# Patient Record
Sex: FEMALE | Race: WHITE | NOT HISPANIC OR LATINO | Employment: OTHER | URBAN - METROPOLITAN AREA
[De-identification: names, ages, dates, MRNs, and addresses within clinical notes are randomized per-mention and may not be internally consistent; named-entity substitution may affect disease eponyms.]

---

## 2023-03-07 PROBLEM — R73.01 IMPAIRED FASTING GLUCOSE: Status: ACTIVE | Noted: 2020-12-17

## 2023-03-07 PROBLEM — J30.9 ALLERGIC RHINITIS: Status: ACTIVE | Noted: 2023-03-07

## 2023-03-07 PROBLEM — F41.9 ANXIETY: Status: ACTIVE | Noted: 2023-03-07

## 2023-03-07 RX ORDER — TRIAMCINOLONE ACETONIDE 55 UG/1
2 SPRAY, METERED NASAL DAILY
COMMUNITY
Start: 2021-10-14

## 2023-03-07 RX ORDER — FLUTICASONE PROPIONATE 50 MCG
2 SPRAY, SUSPENSION (ML) NASAL EVERY MORNING
COMMUNITY
Start: 2020-06-02

## 2023-03-07 RX ORDER — VIT C/E/ZN/COPPR/LUTEIN/ZEAXAN 250MG-90MG
1000 CAPSULE ORAL DAILY
COMMUNITY

## 2023-03-07 RX ORDER — LEVOTHYROXINE SODIUM 100 UG/1
112 TABLET ORAL DAILY
COMMUNITY
End: 2024-02-06 | Stop reason: WASHOUT

## 2023-03-08 ENCOUNTER — OFFICE VISIT (OUTPATIENT)
Dept: PRIMARY CARE | Facility: CLINIC | Age: 63
End: 2023-03-08
Payer: MEDICAID

## 2023-03-08 VITALS
SYSTOLIC BLOOD PRESSURE: 126 MMHG | HEIGHT: 64 IN | BODY MASS INDEX: 27.31 KG/M2 | DIASTOLIC BLOOD PRESSURE: 84 MMHG | TEMPERATURE: 97.6 F | WEIGHT: 160 LBS | HEART RATE: 76 BPM

## 2023-03-08 DIAGNOSIS — J32.1 CHRONIC FRONTAL SINUSITIS: Primary | ICD-10-CM

## 2023-03-08 PROCEDURE — 99213 OFFICE O/P EST LOW 20 MIN: CPT | Performed by: FAMILY MEDICINE

## 2023-03-08 RX ORDER — DOXYCYCLINE 100 MG/1
100 CAPSULE ORAL 2 TIMES DAILY
Qty: 28 CAPSULE | Refills: 0 | Status: SHIPPED | OUTPATIENT
Start: 2023-03-08 | End: 2023-03-22

## 2023-03-08 ASSESSMENT — PATIENT HEALTH QUESTIONNAIRE - PHQ9
2. FEELING DOWN, DEPRESSED OR HOPELESS: NOT AT ALL
SUM OF ALL RESPONSES TO PHQ9 QUESTIONS 1 AND 2: 0
1. LITTLE INTEREST OR PLEASURE IN DOING THINGS: NOT AT ALL

## 2023-03-08 ASSESSMENT — ENCOUNTER SYMPTOMS
SINUS PAIN: 1
FACIAL SWELLING: 1

## 2023-03-08 NOTE — PROGRESS NOTES
Subjective   Patient ID: Lei Day is a 62 y.o. female who presents for Facial Pain (X 2 months./JW) and Earache (L ear x 1 day./JW).  Sinusitis sx: x2 months.  Congestion, drainage and ear pain x 2 days on L side.    Facial Pain  Associated symptoms include congestion.   Earache         Current Outpatient Medications on File Prior to Visit   Medication Sig Dispense Refill    cholecalciferol (Vitamin D-3) 25 MCG (1000 UT) capsule       levothyroxine (Synthroid, Levoxyl) 100 mcg tablet Take 1 tablet (100 mcg) by mouth once daily.      fluticasone (Flonase) 50 mcg/actuation nasal spray Administer 2 sprays into each nostril once daily in the morning.      triamcinolone (Nasacort) 55 mcg nasal inhaler Administer 2 sprays into each nostril once daily.       No current facility-administered medications on file prior to visit.        Review of Systems   HENT:  Positive for congestion, ear pain, facial swelling and sinus pain.        Objective   Physical Exam    No visits with results within 2 Month(s) from this visit.   Latest known visit with results is:   [facility] Encounter on 05/03/2022   Component Date Value Ref Range Status    Hemoglobin A1C 05/03/2022 6.0 (A)  % Final    Comment:      Diagnosis of Diabetes-Adults   Non-Diabetic: < or = 5.6%   Increased risk for developing diabetes: 5.7-6.4%   Diagnostic of diabetes: > or = 6.5%  .       Monitoring of Diabetes                Age (y)     Therapeutic Goal (%)   Adults:          >18           <7.0   Pediatrics:    13-18           <7.5                   7-12           <8.0                   0- 6            7.5-8.5   American Diabetes Association. Diabetes Care 33(S1), Jan 2010.      Estimated Average Glucose 05/03/2022 126  MG/DL Final    TSH 05/03/2022 3.17  0.44 - 3.98 mIU/L Final    Comment:  TSH testing is performed using different testing    methodology at [facility] than at other    [facility]. Direct result comparisons should    only be made  within the same method.         Assessment/Plan   Problem List Items Addressed This Visit    None  Visit Diagnoses       Chronic frontal sinusitis    -  Primary    Relevant Medications    doxycycline (Vibramycin) 100 mg capsule        Treating patient with 2-week course of doxycycline

## 2023-08-04 ENCOUNTER — OFFICE VISIT (OUTPATIENT)
Dept: PRIMARY CARE | Facility: CLINIC | Age: 63
End: 2023-08-04
Payer: MEDICAID

## 2023-08-04 VITALS
HEART RATE: 92 BPM | TEMPERATURE: 97.3 F | OXYGEN SATURATION: 97 % | WEIGHT: 150 LBS | BODY MASS INDEX: 25.75 KG/M2 | SYSTOLIC BLOOD PRESSURE: 120 MMHG | DIASTOLIC BLOOD PRESSURE: 72 MMHG

## 2023-08-04 DIAGNOSIS — M25.561 RIGHT KNEE PAIN, UNSPECIFIED CHRONICITY: Primary | ICD-10-CM

## 2023-08-04 DIAGNOSIS — M11.261 CHONDROCALCINOSIS OF RIGHT KNEE: ICD-10-CM

## 2023-08-04 PROCEDURE — 99213 OFFICE O/P EST LOW 20 MIN: CPT | Performed by: PHYSICIAN ASSISTANT

## 2023-08-04 RX ORDER — DICLOFENAC SODIUM 75 MG/1
75 TABLET, DELAYED RELEASE ORAL 2 TIMES DAILY PRN
Qty: 20 TABLET | Refills: 0 | Status: SHIPPED | OUTPATIENT
Start: 2023-08-04 | End: 2024-01-01 | Stop reason: ALTCHOICE

## 2023-08-04 ASSESSMENT — ENCOUNTER SYMPTOMS
ABDOMINAL PAIN: 0
SHORTNESS OF BREATH: 0

## 2023-08-04 NOTE — PROGRESS NOTES
" Subjective   Patient ID: Lei Day is a 62 y.o. female who presents for Knee Pain (R knee pain x 3 days. NKI).    HPI   Getting flare up of right knee pain the past 3 days.  Has had recurrent flare ups of this knee like this in the past. Been doing a lot of house/yard work recently, including kneeling on it recently.  Getting pain and is a little swollen. Especially hurts on inner side of knee and behind knee.  It gets pretty tender. Last bad flare up was 9/2022, but sometimes get some ache in it.  Using some Voltaren gel.     Right knee xray 9/26/22 showed \"Mild patellofemoral osteoarthrosis. Chondrocalcinosis of the medial and lateral compartments which are otherwise preserved, although can be seen with early changes of CPPD arthropathy.\"    Review of Systems   Respiratory:  Negative for shortness of breath.    Cardiovascular:  Negative for chest pain.   Gastrointestinal:  Negative for abdominal pain.       Objective   /72   Pulse 92   Temp 36.3 °C (97.3 °F)   Wt 68 kg (150 lb)   SpO2 97%   BMI 25.75 kg/m²     Physical Exam  Vitals and nursing note reviewed.   Constitutional:       Appearance: Normal appearance. She is well-developed.   Eyes:      General: No scleral icterus.  Cardiovascular:      Rate and Rhythm: Normal rate and regular rhythm.      Heart sounds: No murmur heard.  Pulmonary:      Effort: Pulmonary effort is normal.      Breath sounds: Normal breath sounds.   Musculoskeletal:      Cervical back: Neck supple.      Right knee: No erythema. Tenderness (Tenderness over medial and posterior aspect of knee. Minimal swelling. No redness.) present.      Instability Tests: Anterior drawer test negative. Posterior drawer test negative.      Left knee: Normal.   Skin:     General: Skin is warm and dry.   Neurological:      Mental Status: She is alert.         Assessment/Plan   Diagnoses and all orders for this visit:  Right knee pain, unspecified chronicity  -     Referral to Orthopaedic " Surgery; Future  -     diclofenac (Voltaren) 75 mg EC tablet; Take 1 tablet (75 mg) by mouth 2 times a day as needed (pain).  Chondrocalcinosis of right knee  -     Referral to Orthopaedic Surgery; Future  -     diclofenac (Voltaren) 75 mg EC tablet; Take 1 tablet (75 mg) by mouth 2 times a day as needed (pain).       Rx Diclofenac bid with food. Ice.   Due to chondrocalcinosis seen on xray and repeated flare up of pain, referred to orthopedic  for further evaluation -- discussed possibility of CPPD arthropathy.   Follow-up if symptoms significant increase prior to seeing orthopedic.

## 2023-10-12 ENCOUNTER — APPOINTMENT (OUTPATIENT)
Dept: PRIMARY CARE | Facility: CLINIC | Age: 63
End: 2023-10-12
Payer: MEDICAID

## 2024-02-06 ENCOUNTER — OFFICE VISIT (OUTPATIENT)
Dept: PRIMARY CARE | Facility: CLINIC | Age: 64
End: 2024-02-06
Payer: MEDICAID

## 2024-02-06 VITALS
DIASTOLIC BLOOD PRESSURE: 66 MMHG | WEIGHT: 153 LBS | OXYGEN SATURATION: 99 % | SYSTOLIC BLOOD PRESSURE: 108 MMHG | TEMPERATURE: 97.9 F | HEART RATE: 86 BPM | BODY MASS INDEX: 26.26 KG/M2

## 2024-02-06 DIAGNOSIS — E03.9 HYPOTHYROIDISM, UNSPECIFIED TYPE: Primary | ICD-10-CM

## 2024-02-06 DIAGNOSIS — E78.5 HYPERLIPIDEMIA, UNSPECIFIED HYPERLIPIDEMIA TYPE: ICD-10-CM

## 2024-02-06 DIAGNOSIS — H93.12 TINNITUS OF LEFT EAR: ICD-10-CM

## 2024-02-06 DIAGNOSIS — R73.01 IFG (IMPAIRED FASTING GLUCOSE): ICD-10-CM

## 2024-02-06 PROCEDURE — 99214 OFFICE O/P EST MOD 30 MIN: CPT | Performed by: PHYSICIAN ASSISTANT

## 2024-02-06 RX ORDER — BLOOD SUGAR DIAGNOSTIC
STRIP MISCELLANEOUS
COMMUNITY
Start: 2024-01-15

## 2024-02-06 RX ORDER — LEVOTHYROXINE SODIUM 25 UG/1
25 TABLET ORAL DAILY
COMMUNITY
End: 2024-05-14 | Stop reason: ALTCHOICE

## 2024-02-06 ASSESSMENT — ENCOUNTER SYMPTOMS: SHORTNESS OF BREATH: 0

## 2024-02-06 NOTE — PROGRESS NOTES
"Subjective   Patient ID: Lei Day is a 63 y.o. female who presents for Hypothyroidism (Recheck. ), IFG (Discuss BW order), and Hearing Problem (Discuss sound in L ear when laying down x\"years\".).    HPI   Patient presents for recheck of hypothyroidism and IFG.     Was seeing endocrinologist Dr Sharma for her thyroid but hasn't been there since last year.      Was taking a 112mcg but switched to 25mcg about 3 months aog.  States when she is on the full dose she gets more aches. Feels better on 25mcg. Hasn't had level checked since doing this.   Had to rescheduled her appt with Dr Sharma since the Dr had to cancel.     TSH (mIU/L)   Date Value   05/03/2022 3.17   06/02/2021 3.18     Free T4 (ng/dL)   Date Value   10/08/2019 1.00         Also has hx of IFG.  Trying to watch diet closer.  Home sugars have been good when she checks them.  Has tried to lose wt.   Hemoglobin A1C (%)   Date Value   05/03/2022 6.0 (A)   06/02/2021 6.0     Glucose (mg/dL)   Date Value   06/02/2021 84   07/29/2020 111 (H)       Hyperlipidemia:   Hx of mildly elevated LDL in the past. Has improved diet since then.   No results found for: \"LDLCALC\"  LDL (mg/dL)   Date Value   10/08/2019 114 (H)     Triglycerides (mg/dL)   Date Value   10/08/2019 80     HDL (mg/dL)   Date Value   10/08/2019 65.0        Getting ringing/\"cricket sound\"  in left ear intermittently for the past few years.  Symptoms are more noticeable at night when she is laying down.  No ear pain.       reports that she has been smoking cigarettes. She started smoking about 46 years ago. She has been smoking an average of .5 packs per day. She has never used smokeless tobacco.    Review of Systems   HENT:  Negative for congestion and ear pain.    Respiratory:  Negative for shortness of breath.    Cardiovascular:  Negative for chest pain.       Objective   /66   Pulse 86   Temp 36.6 °C (97.9 °F)   Wt 69.4 kg (153 lb) Comment: with boots and coat  SpO2 99%   BMI 26.26 " kg/m²     Physical Exam  Vitals and nursing note reviewed.   Constitutional:       Appearance: Normal appearance. She is well-developed.   HENT:      Right Ear: Tympanic membrane, ear canal and external ear normal.      Left Ear: Tympanic membrane, ear canal and external ear normal.   Eyes:      General: No scleral icterus.  Cardiovascular:      Rate and Rhythm: Normal rate and regular rhythm.   Pulmonary:      Effort: Pulmonary effort is normal.      Breath sounds: Normal breath sounds.   Musculoskeletal:      Cervical back: Neck supple.   Skin:     General: Skin is warm and dry.   Neurological:      Mental Status: She is alert.         Assessment/Plan   Diagnoses and all orders for this visit:  Hypothyroidism, unspecified type  -     TSH with reflex to Free T4 if abnormal; Future  IFG (impaired fasting glucose)  -     Hemoglobin A1C; Future  -     Comprehensive Metabolic Panel; Future  Hyperlipidemia, unspecified hyperlipidemia type  -     Lipid Panel; Future  -     Comprehensive Metabolic Panel; Future  Tinnitus of left ear  -     Referral to ENT; Future       Get fasting labs and will check thyroid level to see if she is getting enough thyroid medication. May need lower dose compared to previously since she lost wt.   Advised to follow up with endocrinologist.   Discussed diet.   Discontinue smoking.  Referred to ENT due to unilateral tinnitus.   Follow up prn.

## 2024-02-19 ENCOUNTER — LAB (OUTPATIENT)
Dept: LAB | Facility: LAB | Age: 64
End: 2024-02-19
Payer: MEDICAID

## 2024-02-19 DIAGNOSIS — E03.9 HYPOTHYROIDISM, UNSPECIFIED TYPE: ICD-10-CM

## 2024-02-19 DIAGNOSIS — R73.01 IFG (IMPAIRED FASTING GLUCOSE): ICD-10-CM

## 2024-02-19 DIAGNOSIS — E78.5 HYPERLIPIDEMIA, UNSPECIFIED HYPERLIPIDEMIA TYPE: ICD-10-CM

## 2024-02-19 LAB
ALBUMIN SERPL BCP-MCNC: 4.2 G/DL (ref 3.4–5)
ALP SERPL-CCNC: 82 U/L (ref 33–136)
ALT SERPL W P-5'-P-CCNC: 16 U/L (ref 7–45)
ANION GAP SERPL CALC-SCNC: 12 MMOL/L (ref 10–20)
AST SERPL W P-5'-P-CCNC: 18 U/L (ref 9–39)
BILIRUB SERPL-MCNC: 0.5 MG/DL (ref 0–1.2)
BUN SERPL-MCNC: 13 MG/DL (ref 6–23)
CALCIUM SERPL-MCNC: 9.4 MG/DL (ref 8.6–10.3)
CHLORIDE SERPL-SCNC: 102 MMOL/L (ref 98–107)
CHOLEST SERPL-MCNC: 206 MG/DL (ref 0–199)
CHOLESTEROL/HDL RATIO: 2.9
CO2 SERPL-SCNC: 26 MMOL/L (ref 21–32)
CREAT SERPL-MCNC: 0.66 MG/DL (ref 0.5–1.05)
EGFRCR SERPLBLD CKD-EPI 2021: >90 ML/MIN/1.73M*2
EST. AVERAGE GLUCOSE BLD GHB EST-MCNC: 126 MG/DL
GLUCOSE SERPL-MCNC: 90 MG/DL (ref 74–99)
HBA1C MFR BLD: 6 %
HDLC SERPL-MCNC: 70.9 MG/DL
LDLC SERPL CALC-MCNC: 120 MG/DL
NON HDL CHOLESTEROL: 135 MG/DL (ref 0–149)
POTASSIUM SERPL-SCNC: 4 MMOL/L (ref 3.5–5.3)
PROT SERPL-MCNC: 7.3 G/DL (ref 6.4–8.2)
SODIUM SERPL-SCNC: 136 MMOL/L (ref 136–145)
T4 FREE SERPL-MCNC: 0.77 NG/DL (ref 0.61–1.12)
TRIGL SERPL-MCNC: 76 MG/DL (ref 0–149)
TSH SERPL-ACNC: 9.7 MIU/L (ref 0.44–3.98)
VLDL: 15 MG/DL (ref 0–40)

## 2024-02-19 PROCEDURE — 80053 COMPREHEN METABOLIC PANEL: CPT

## 2024-02-19 PROCEDURE — 84443 ASSAY THYROID STIM HORMONE: CPT

## 2024-02-19 PROCEDURE — 83036 HEMOGLOBIN GLYCOSYLATED A1C: CPT

## 2024-02-19 PROCEDURE — 36415 COLL VENOUS BLD VENIPUNCTURE: CPT

## 2024-02-19 PROCEDURE — 80061 LIPID PANEL: CPT

## 2024-02-19 PROCEDURE — 84439 ASSAY OF FREE THYROXINE: CPT

## 2024-04-15 DIAGNOSIS — E03.9 HYPOTHYROIDISM, UNSPECIFIED: Primary | ICD-10-CM

## 2024-05-06 ENCOUNTER — LAB (OUTPATIENT)
Dept: LAB | Facility: LAB | Age: 64
End: 2024-05-06
Payer: MEDICAID

## 2024-05-06 DIAGNOSIS — E03.9 HYPOTHYROIDISM, UNSPECIFIED: ICD-10-CM

## 2024-05-06 LAB — TSH SERPL-ACNC: 4.98 MIU/L (ref 0.44–3.98)

## 2024-05-06 PROCEDURE — 84443 ASSAY THYROID STIM HORMONE: CPT

## 2024-05-06 PROCEDURE — 36415 COLL VENOUS BLD VENIPUNCTURE: CPT

## 2024-05-14 ENCOUNTER — OFFICE VISIT (OUTPATIENT)
Dept: PRIMARY CARE | Facility: CLINIC | Age: 64
End: 2024-05-14
Payer: MEDICAID

## 2024-05-14 ENCOUNTER — LAB (OUTPATIENT)
Dept: LAB | Facility: LAB | Age: 64
End: 2024-05-14
Payer: MEDICAID

## 2024-05-14 VITALS
WEIGHT: 147 LBS | OXYGEN SATURATION: 96 % | SYSTOLIC BLOOD PRESSURE: 118 MMHG | DIASTOLIC BLOOD PRESSURE: 62 MMHG | HEART RATE: 81 BPM | TEMPERATURE: 97.6 F | BODY MASS INDEX: 25.23 KG/M2

## 2024-05-14 DIAGNOSIS — G89.29 CHRONIC PAIN OF MULTIPLE JOINTS: Primary | ICD-10-CM

## 2024-05-14 DIAGNOSIS — M25.50 CHRONIC PAIN OF MULTIPLE JOINTS: Primary | ICD-10-CM

## 2024-05-14 DIAGNOSIS — G89.29 CHRONIC PAIN OF MULTIPLE JOINTS: ICD-10-CM

## 2024-05-14 DIAGNOSIS — M25.50 CHRONIC PAIN OF MULTIPLE JOINTS: ICD-10-CM

## 2024-05-14 PROBLEM — E78.5 HYPERLIPIDEMIA: Status: ACTIVE | Noted: 2024-02-19

## 2024-05-14 LAB
BASOPHILS # BLD AUTO: 0.05 X10*3/UL (ref 0–0.1)
BASOPHILS NFR BLD AUTO: 0.5 %
EOSINOPHIL # BLD AUTO: 0.39 X10*3/UL (ref 0–0.7)
EOSINOPHIL NFR BLD AUTO: 4.1 %
ERYTHROCYTE [DISTWIDTH] IN BLOOD BY AUTOMATED COUNT: 13.2 % (ref 11.5–14.5)
ERYTHROCYTE [SEDIMENTATION RATE] IN BLOOD BY WESTERGREN METHOD: 15 MM/H (ref 0–30)
HCT VFR BLD AUTO: 43.4 % (ref 36–46)
HGB BLD-MCNC: 14.3 G/DL (ref 12–16)
IMM GRANULOCYTES # BLD AUTO: 0.02 X10*3/UL (ref 0–0.7)
IMM GRANULOCYTES NFR BLD AUTO: 0.2 % (ref 0–0.9)
LYMPHOCYTES # BLD AUTO: 3.42 X10*3/UL (ref 1.2–4.8)
LYMPHOCYTES NFR BLD AUTO: 36 %
MCH RBC QN AUTO: 31.3 PG (ref 26–34)
MCHC RBC AUTO-ENTMCNC: 32.9 G/DL (ref 32–36)
MCV RBC AUTO: 95 FL (ref 80–100)
MONOCYTES # BLD AUTO: 0.75 X10*3/UL (ref 0.1–1)
MONOCYTES NFR BLD AUTO: 7.9 %
NEUTROPHILS # BLD AUTO: 4.86 X10*3/UL (ref 1.2–7.7)
NEUTROPHILS NFR BLD AUTO: 51.3 %
NRBC BLD-RTO: 0 /100 WBCS (ref 0–0)
PLATELET # BLD AUTO: 256 X10*3/UL (ref 150–450)
RBC # BLD AUTO: 4.57 X10*6/UL (ref 4–5.2)
URATE SERPL-MCNC: 4.7 MG/DL (ref 2.3–6.7)
WBC # BLD AUTO: 9.5 X10*3/UL (ref 4.4–11.3)

## 2024-05-14 PROCEDURE — 85652 RBC SED RATE AUTOMATED: CPT

## 2024-05-14 PROCEDURE — 99214 OFFICE O/P EST MOD 30 MIN: CPT | Performed by: PHYSICIAN ASSISTANT

## 2024-05-14 PROCEDURE — 84550 ASSAY OF BLOOD/URIC ACID: CPT

## 2024-05-14 PROCEDURE — 86431 RHEUMATOID FACTOR QUANT: CPT

## 2024-05-14 PROCEDURE — 85025 COMPLETE CBC W/AUTO DIFF WBC: CPT

## 2024-05-14 PROCEDURE — 36415 COLL VENOUS BLD VENIPUNCTURE: CPT

## 2024-05-14 PROCEDURE — 86038 ANTINUCLEAR ANTIBODIES: CPT

## 2024-05-14 RX ORDER — LEVOTHYROXINE SODIUM 88 UG/1
88 TABLET ORAL
COMMUNITY

## 2024-05-14 NOTE — PROGRESS NOTES
"Subjective   Patient ID: Lei Day is a 63 y.o. female who presents for Joint Pain (Discuss arthritis/joint pain x\"months\").    HPI   Patient complains of multiple joint pains.   Getting pains in finger knuckles, elbows, shoulders (L>R), knees, and hips (R>L). Hurts to raise her arms.  Has had some of these aches for years, but symptoms have worsened in the past year. Symptoms wax and wane. No joint redness. Sometime knuckles seem swollen.   Using ibuprofen helps.   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       Right knee xray 9/26/22 showed \"Mild patellofemoral osteoarthrosis. Chondrocalcinosis of the medial and lateral compartments which are otherwise preserved, although can be seen with early changes of CPPD arthropathy.\"   Was referred to orthopedic for this but patient never went since knee flare up she was having at the time started feeling better.     Past Medical History:   Diagnosis Date    Personal history of other endocrine, nutritional and metabolic disease     History of hypothyroidism      Family History   Problem Relation Name Age of Onset    Breast cancer Mother          Lead to death    Coronary artery disease Father          Bypass - Death.    Diabetes type II Father      Alcohol abuse Father          through childhood    Hyperlipidemia Father          hypercholesterolemia      Social History     Tobacco Use    Smoking status: Every Day     Current packs/day: 0.50     Average packs/day: 0.5 packs/day for 46.4 years (23.2 ttl pk-yrs)     Types: Cigarettes     Start date: 1978    Smokeless tobacco: Never        Review of Systems   Constitutional:  Negative for fever.   Cardiovascular:  Negative for leg swelling.       Objective   /62   Pulse 81   Temp 36.4 °C (97.6 °F)   Wt 66.7 kg (147 lb)   SpO2 96%   BMI 25.23 kg/m²     Physical Exam  Vitals and nursing note reviewed.   Constitutional:       Appearance: Normal appearance. She is " well-developed.   Eyes:      General: No scleral icterus.  Cardiovascular:      Rate and Rhythm: Normal rate and regular rhythm.   Pulmonary:      Effort: Pulmonary effort is normal.      Breath sounds: Normal breath sounds.   Musculoskeletal:         General: No swelling.      Right elbow: No swelling or deformity. No tenderness.      Left elbow: No swelling or deformity. No tenderness.      Right wrist: No swelling, deformity or bony tenderness.      Left wrist: No swelling, deformity or bony tenderness.      Cervical back: Neck supple.      Right knee: No swelling or erythema.      Left knee: No swelling or erythema.      Right lower leg: No edema.      Left lower leg: No edema.   Skin:     General: Skin is warm and dry.   Neurological:      Mental Status: She is alert.   Psychiatric:         Mood and Affect: Mood normal.         Behavior: Behavior normal.       Assessment/Plan   Diagnoses and all orders for this visit:  Chronic pain of multiple joints  -     Referral to Rheumatology; Future  -     BERTHA with Reflex to JASBIR; Future  -     Uric acid; Future  -     Sedimentation rate, automated; Future  -     Rheumatoid factor; Future  -     CBC and Auto Differential; Future       Get labs today to evaluate further.   Referred to rheumatologist for opinion.   Try glucosamine chondroitin.   Can use Tylenol Arthritis prn.  Can use Voltaren gel.   Eat anti-inflammatory diet.   Follow up if symptoms significantly increase prior to seeing specialist.

## 2024-05-15 LAB — RHEUMATOID FACT SER NEPH-ACNC: <10 IU/ML (ref 0–15)

## 2024-05-18 LAB
ANA SER QL HEP2 SUBST: NEGATIVE
CYTOPLASMIC PATTERN: NORMAL

## 2024-05-19 ASSESSMENT — ENCOUNTER SYMPTOMS: FEVER: 0

## 2024-08-15 ENCOUNTER — LAB (OUTPATIENT)
Dept: LAB | Facility: LAB | Age: 64
End: 2024-08-15
Payer: MEDICAID

## 2024-08-15 DIAGNOSIS — E03.9 HYPOTHYROIDISM, UNSPECIFIED: Primary | ICD-10-CM

## 2024-08-15 LAB — TSH SERPL-ACNC: 4.28 MIU/L (ref 0.44–3.98)

## 2024-08-15 PROCEDURE — 36415 COLL VENOUS BLD VENIPUNCTURE: CPT

## 2024-08-15 PROCEDURE — 84443 ASSAY THYROID STIM HORMONE: CPT

## 2024-09-16 ENCOUNTER — LAB (OUTPATIENT)
Dept: LAB | Facility: LAB | Age: 64
End: 2024-09-16
Payer: MEDICAID

## 2024-09-16 DIAGNOSIS — E03.9 HYPOTHYROIDISM, UNSPECIFIED: Primary | ICD-10-CM

## 2024-09-16 LAB — TSH SERPL-ACNC: 3.31 MIU/L (ref 0.44–3.98)

## 2024-09-16 PROCEDURE — 84443 ASSAY THYROID STIM HORMONE: CPT

## 2024-09-16 PROCEDURE — 36415 COLL VENOUS BLD VENIPUNCTURE: CPT

## 2024-10-11 ENCOUNTER — APPOINTMENT (OUTPATIENT)
Dept: PRIMARY CARE | Facility: CLINIC | Age: 64
End: 2024-10-11
Payer: MEDICAID

## 2024-11-12 ENCOUNTER — APPOINTMENT (OUTPATIENT)
Dept: PRIMARY CARE | Facility: CLINIC | Age: 64
End: 2024-11-12
Payer: MEDICAID

## 2025-02-19 ENCOUNTER — APPOINTMENT (OUTPATIENT)
Dept: RHEUMATOLOGY | Facility: CLINIC | Age: 65
End: 2025-02-19
Payer: COMMERCIAL

## 2025-04-01 ENCOUNTER — APPOINTMENT (OUTPATIENT)
Dept: PRIMARY CARE | Facility: CLINIC | Age: 65
End: 2025-04-01
Payer: COMMERCIAL

## 2025-04-01 VITALS
TEMPERATURE: 98 F | WEIGHT: 155 LBS | DIASTOLIC BLOOD PRESSURE: 84 MMHG | SYSTOLIC BLOOD PRESSURE: 128 MMHG | OXYGEN SATURATION: 97 % | HEIGHT: 64 IN | HEART RATE: 95 BPM | BODY MASS INDEX: 26.46 KG/M2

## 2025-04-01 DIAGNOSIS — Z12.31 ENCOUNTER FOR SCREENING MAMMOGRAM FOR MALIGNANT NEOPLASM OF BREAST: ICD-10-CM

## 2025-04-01 DIAGNOSIS — Z00.00 ROUTINE GENERAL MEDICAL EXAMINATION AT A HEALTH CARE FACILITY: ICD-10-CM

## 2025-04-01 DIAGNOSIS — E78.5 HYPERLIPIDEMIA, UNSPECIFIED HYPERLIPIDEMIA TYPE: ICD-10-CM

## 2025-04-01 DIAGNOSIS — G89.29 CHRONIC LEFT SHOULDER PAIN: ICD-10-CM

## 2025-04-01 DIAGNOSIS — M25.512 CHRONIC LEFT SHOULDER PAIN: ICD-10-CM

## 2025-04-01 DIAGNOSIS — Z00.00 WELCOME TO MEDICARE PREVENTIVE VISIT: Primary | ICD-10-CM

## 2025-04-01 DIAGNOSIS — Z13.820 SCREENING FOR OSTEOPOROSIS: ICD-10-CM

## 2025-04-01 DIAGNOSIS — Z72.0 TOBACCO ABUSE: ICD-10-CM

## 2025-04-01 DIAGNOSIS — R06.09 DYSPNEA ON EXERTION: ICD-10-CM

## 2025-04-01 DIAGNOSIS — R94.31 ABNORMAL EKG: ICD-10-CM

## 2025-04-01 DIAGNOSIS — R73.01 IFG (IMPAIRED FASTING GLUCOSE): ICD-10-CM

## 2025-04-01 DIAGNOSIS — Z12.11 COLON CANCER SCREENING: ICD-10-CM

## 2025-04-01 DIAGNOSIS — Z82.49 FAMILY HISTORY OF PREMATURE CORONARY HEART DISEASE: ICD-10-CM

## 2025-04-01 DIAGNOSIS — Z78.0 MENOPAUSE: ICD-10-CM

## 2025-04-01 PROCEDURE — 1159F MED LIST DOCD IN RCRD: CPT | Performed by: PHYSICIAN ASSISTANT

## 2025-04-01 PROCEDURE — 1124F ACP DISCUSS-NO DSCNMKR DOCD: CPT | Performed by: PHYSICIAN ASSISTANT

## 2025-04-01 PROCEDURE — 3008F BODY MASS INDEX DOCD: CPT | Performed by: PHYSICIAN ASSISTANT

## 2025-04-01 PROCEDURE — 1170F FXNL STATUS ASSESSED: CPT | Performed by: PHYSICIAN ASSISTANT

## 2025-04-01 PROCEDURE — 1160F RVW MEDS BY RX/DR IN RCRD: CPT | Performed by: PHYSICIAN ASSISTANT

## 2025-04-01 PROCEDURE — G0403 EKG FOR INITIAL PREVENT EXAM: HCPCS | Performed by: PHYSICIAN ASSISTANT

## 2025-04-01 PROCEDURE — G0402 INITIAL PREVENTIVE EXAM: HCPCS | Performed by: PHYSICIAN ASSISTANT

## 2025-04-01 PROCEDURE — 99214 OFFICE O/P EST MOD 30 MIN: CPT | Performed by: PHYSICIAN ASSISTANT

## 2025-04-01 RX ORDER — LEVOTHYROXINE SODIUM 100 UG/1
100 TABLET ORAL DAILY
COMMUNITY
Start: 2025-02-15

## 2025-04-01 ASSESSMENT — ACTIVITIES OF DAILY LIVING (ADL)
GROCERY_SHOPPING: INDEPENDENT
TAKING_MEDICATION: INDEPENDENT
DOING_HOUSEWORK: INDEPENDENT
DRESSING: INDEPENDENT
MANAGING_FINANCES: INDEPENDENT
BATHING: INDEPENDENT

## 2025-04-01 ASSESSMENT — PATIENT HEALTH QUESTIONNAIRE - PHQ9
1. LITTLE INTEREST OR PLEASURE IN DOING THINGS: SEVERAL DAYS
SUM OF ALL RESPONSES TO PHQ9 QUESTIONS 1 AND 2: 1
10. IF YOU CHECKED OFF ANY PROBLEMS, HOW DIFFICULT HAVE THESE PROBLEMS MADE IT FOR YOU TO DO YOUR WORK, TAKE CARE OF THINGS AT HOME, OR GET ALONG WITH OTHER PEOPLE: SOMEWHAT DIFFICULT
2. FEELING DOWN, DEPRESSED OR HOPELESS: NOT AT ALL

## 2025-04-01 NOTE — PROGRESS NOTES
Subjective   Reason for Visit: Lei Day is an 64 y.o. female here for a Medicare Wellness visit.     Past Medical, Surgical, and Family History reviewed and updated in chart.    Reviewed all medications by prescribing practitioner or clinical pharmacist (such as prescriptions, OTCs, herbal therapies and supplements) and documented in the medical record.    HPI    Welcome to Medicare Exam.      Reviewed patient's answers to all Medicare screening questionnaire questions regarding falls, depression, general health status, nutrition and exercise, functional ability/level of safety, home safety risks, and ADLs/IADLs.      Healthcare POA and Living Will status: doesn't have this in place.  Instructed on setting this up.      Reviewed meds and discussed consistent use of meds as prescribed. Is not taking any high risk controlled/opioid medications.     No bowel or bladder incontinence.       Colonoscopy: Not had colonoscopy and refuses to do one. Willing to do Cologuard -- ordered today.   Mammogram:  last was 8/2018 -- ordered today.   Last DEXA scan: never has had done -- ordered today.      The patient has not felt down, felt depressed, felt hopeless, felt socially isolated or had little interest or pleasure in doing things over the past 2 weeks. No trouble with bathing, communicating, using the phone, dressing, eating, preparing meals, grooming, walking, toileting, house work, managing money, medications, shopping or transportation. Reports no falls.  The home does have grab bars in the bathroom. Has handrails on the stairs. The home does not have poor lighting or loose rugs.   Doesn't have hearing difficulty. No diet restrictions. Eats well balanced diet.  No recent hospitalizations.     Feels health is good.       chronic pain (pain scale 4-10/10) --- mainly shoulder pain the past few months.      Regularly exercises.      No cognitive impairment.      No DM or nephropathy.      Discussed getting yearly flu  "shots.         Colon cancer screening:  due now.  Ordered Cologuard today.   Mammogram: due now -- ordered today.   DEXA scan:  Due now -- ordered today.   Influenza: recommend every fall.   Prevnar 20: due now.   Shingles vaccine: due now --can get at pharmacy.  Tdap:  Due now -- can get at pharmacy.       Patient Care Team:  Yuval Rendon PA-C as PCP - General  Yuval Rendon PA-C as PCP - [facility] Medicaid PCP   Gyn is Dr Gomez.   Endocrinology Dr Sharma      Review of Systems   Constitutional:  Negative for chills and fever.   Respiratory:          Has chronic mild dyspnea with exertion   Cardiovascular:  Negative for chest pain.           Past Medical History:   Diagnosis Date    Personal history of other endocrine, nutritional and metabolic disease     History of hypothyroidism      Family History   Problem Relation Name Age of Onset    Breast cancer Mother          Lead to death    Coronary artery disease Father          Bypass - Death.    Diabetes type II Father      Alcohol abuse Father          through childhood    Hyperlipidemia Father          hypercholesterolemia      Social History     Tobacco Use    Smoking status: Every Day     Current packs/day: 0.50     Average packs/day: 0.5 packs/day for 47.3 years (23.6 ttl pk-yrs)     Types: Cigarettes     Start date: 1978    Smokeless tobacco: Never        Objective   Vitals:  /84   Pulse 95   Temp 36.7 °C (98 °F)   Ht 1.613 m (5' 3.5\")   Wt 70.3 kg (155 lb)   SpO2 97%   BMI 27.03 kg/m²     Vision Screening    Right eye Left eye Both eyes   Without correction 20/50 -1 20/70 20/50 -1   With correction            Physical Exam  Vitals and nursing note reviewed.   Constitutional:       Appearance: Normal appearance. She is well-developed.   Eyes:      General: No scleral icterus.  Cardiovascular:      Rate and Rhythm: Normal rate and regular rhythm.   Pulmonary:      Effort: Pulmonary effort is normal.      Breath sounds: Normal breath sounds. "   Abdominal:      Palpations: Abdomen is soft. There is no mass.      Tenderness: There is no abdominal tenderness.   Musculoskeletal:      Cervical back: Neck supple.      Comments: Left shoulder with pain with abduction against resistance.  Has some discomfort with external rotation.   Skin:     General: Skin is warm and dry.   Neurological:      Mental Status: She is alert.         Assessment & Plan  Welcome to Medicare preventive visit    Orders:    XR DEXA bone density; Future    ECG 12 lead (Clinic Performed)    Lipid Panel; Future    Hemoglobin A1C; Future    Comprehensive Metabolic Panel; Future    Encounter for screening mammogram for malignant neoplasm of breast    Orders:    BI mammo bilateral screening tomosynthesis; Future    Screening for osteoporosis    Orders:    XR DEXA bone density; Future    Menopause    Orders:    XR DEXA bone density; Future    Colon cancer screening    Orders:    Cologuard® colon cancer screening; Future    Abnormal EKG    Orders:    Nuclear Stress Test; Future    Family history of premature coronary heart disease    Orders:    Nuclear Stress Test; Future    Dyspnea on exertion    Orders:    Nuclear Stress Test; Future    Chronic left shoulder pain    Orders:    XR shoulder left 2+ views; Future    Referral to Orthopaedic Surgery; Future    Tobacco abuse    Orders:    Nuclear Stress Test; Future    Hyperlipidemia, unspecified hyperlipidemia type    Orders:    Lipid Panel; Future    Comprehensive Metabolic Panel; Future    Follow Up In Primary Care; Future    IFG (impaired fasting glucose)    Orders:    Hemoglobin A1C; Future    Comprehensive Metabolic Panel; Future    Follow Up In Primary Care; Future    Routine general medical examination at a health care facility    Orders:    Follow Up In Primary Care; Future              Discussed wellness and safety issues.   Discussed diet and exercise.   Discussed preventative screenings and schedule for this and immunizations --  provided this to patient.    EKG done and showed NSR with nonspecific ST and T-wave (flattening) changes. No specific acute  ischemic changes noted.  No comparison EKGs available.        Will get NST due to abnormal EKG as well as her dyspnea with exertion and family hx of dad having had heart disease starting in his 40's.  She is a smoker.   Go to ER if develops CP or severe SOB.   See eye     Colon cancer screening:  due now.  Ordered Cologuard today.   Mammogram: due now -- ordered today.   DEXA scan:  Due now -- ordered today.   Influenza: recommend every fall.   Prevnar 20: due now.  Pt refuses now but will think about it.   Shingles vaccine: due now --can get at pharmacy.  Tdap:  Due now -- can get at pharmacy.

## 2025-04-01 NOTE — PROGRESS NOTES
"Subjective   Patient ID: Lei Day is a 64 y.o. female who presents for Elbow Pain (L elbow pain radiating up L arm x\"months\"- x4 months getting worse. NKI ) and Medicare Annual Wellness Visit Subsequent.    HPI   Patient presents for recheck of hypothyroidism and IFG, and Welcome to Medicare exam.   Patient complains of left shoulder pain and elbow pain the past 6 months. Hurts to lift it or reach arm out. Using Voltaren gel and Tylenol arthritis.      Seeing endocrinologist Dr Sharma for her thyroid. Taking Synthroid 100mcg every day. Last saw Dr Sharma 8/16/24.   Thyroid Stimulating Hormone (mIU/L)   Date Value   09/16/2024 3.31   08/15/2024 4.28 (H)     Thyroxine, Free (ng/dL)   Date Value   02/19/2024 0.77     Free T4 (ng/dL)   Date Value   10/08/2019 1.00       IFG:  Trying to watch diet --limiting carbs.  Home sugars have been good when she checks them.    Hemoglobin A1C (%)   Date Value   02/19/2024 6.0 (H)   05/03/2022 6.0 (A)   06/02/2021 6.0     Glucose (mg/dL)   Date Value   02/19/2024 90   06/02/2021 84   07/29/2020 111 (H)       Hyperlipidemia:  Hx of mildly elevated LDL in the past. Has improved diet since then.   LDL Calculated (mg/dL)   Date Value   02/19/2024 120 (H)     LDL (mg/dL)   Date Value   10/08/2019 114 (H)     Triglycerides (mg/dL)   Date Value   02/19/2024 76   10/08/2019 80     HDL-Cholesterol (mg/dL)   Date Value   02/19/2024 70.9     HDL (mg/dL)   Date Value   10/08/2019 65.0        reports that she has been smoking cigarettes. She started smoking about 47 years ago. She has a 23.6 pack-year smoking history. She has never used smokeless tobacco.    Gyn is Dr Gomez.   Endocrinology Dr Sharma    Colon cancer screening:  due now.  Ordered Cologuard today.   Mammogram: due now -- ordered today.   DEXA scan:  Due now -- ordered today.   Influenza: recommend every fall.   Prevnar 20: due now.   Shingles vaccine: due now --can get at pharmacy.  Tdap:  Due now -- can get at pharmacy. " "    Review of Systems   HENT:  Negative for congestion and ear pain.    Respiratory:          Has chronic dyspnea with exertion.   Cardiovascular:  Negative for chest pain.       Objective   /84   Pulse 95   Temp 36.7 °C (98 °F)   Ht 1.613 m (5' 3.5\")   Wt 70.3 kg (155 lb)   SpO2 97%   BMI 27.03 kg/m²   Vision Screening    Right eye Left eye Both eyes   Without correction 20/50 -1 20/70 20/50 -1   With correction              Physical Exam  Vitals and nursing note reviewed.   Constitutional:       Appearance: Normal appearance. She is well-developed.   HENT:      Right Ear: Tympanic membrane, ear canal and external ear normal.      Left Ear: Tympanic membrane, ear canal and external ear normal.   Eyes:      General: No scleral icterus.  Cardiovascular:      Rate and Rhythm: Normal rate and regular rhythm.   Pulmonary:      Effort: Pulmonary effort is normal.      Breath sounds: Normal breath sounds.   Musculoskeletal:      Cervical back: Neck supple.      Comments: Has left shoulder pain with abduction against resistance.  Has some discomfort with external rotation.   Skin:     General: Skin is warm and dry.   Neurological:      Mental Status: She is alert.         Assessment/Plan   Diagnoses and all orders for this visit:  Welcome to Medicare preventive visit  -     XR DEXA bone density; Future  -     ECG 12 lead (Clinic Performed)  -     Lipid Panel; Future  -     Hemoglobin A1C; Future  -     Comprehensive Metabolic Panel; Future  Encounter for screening mammogram for malignant neoplasm of breast  -     BI mammo bilateral screening tomosynthesis; Future  Screening for osteoporosis  -     XR DEXA bone density; Future  Menopause  -     XR DEXA bone density; Future  Colon cancer screening  -     Cologuard® colon cancer screening; Future  Abnormal EKG  -     Nuclear Stress Test; Future  Family history of premature coronary heart disease  -     Nuclear Stress Test; Future  Dyspnea on exertion  -     " Nuclear Stress Test; Future  Chronic left shoulder pain  -     XR shoulder left 2+ views; Future  -     Referral to Orthopaedic Surgery; Future  Tobacco abuse  -     Nuclear Stress Test; Future  Hyperlipidemia, unspecified hyperlipidemia type  -     Lipid Panel; Future  -     Comprehensive Metabolic Panel; Future  -     Follow Up In Primary Care; Future  IFG (impaired fasting glucose)  -     Hemoglobin A1C; Future  -     Comprehensive Metabolic Panel; Future  -     Follow Up In Primary Care; Future  Routine general medical examination at a health care facility  -     Follow Up In Primary Care; Future         Get fasting labs   EKG done. Shows NSR with nonspecific ST and T-wave (flattening) changes. No specific acute ischemic changes noted.  No comparison EKGs available.    Will get NST due to abnormal EKG as well as her dyspnea with exertion and family hx of dad having had heart disease starting in his 40's.  She is a smoker.   Go to ER if develops CP or severe SOB.   Advised to follow up with endocrinologist.   See eye Dr.  Refer to ortho Dr Vazquez due to ongoing shoulder pain.   Get Xray shoulder.  Mammogram ordered.  DEXA scan ordered.   Cologuard ordered.   Discussed diet.   Discontinue smoking.  Follow up prn.

## 2025-04-01 NOTE — ASSESSMENT & PLAN NOTE
Orders:    Lipid Panel; Future    Comprehensive Metabolic Panel; Future    Follow Up In Primary Care; Future

## 2025-04-01 NOTE — PATIENT INSTRUCTIONS
Colon cancer screening:  due now.  Ordered Cologuard today.   Mammogram: due now -- ordered today.   DEXA scan:  Due now -- ordered today.   Influenza: recommend every fall.   Prevnar 20: due now.   Shingles vaccine: due now --can get at pharmacy.  Tdap:  Due now -- can get at pharmacy.

## 2025-04-02 DIAGNOSIS — E78.5 HYPERLIPIDEMIA, UNSPECIFIED HYPERLIPIDEMIA TYPE: ICD-10-CM

## 2025-04-02 DIAGNOSIS — Z00.00 WELCOME TO MEDICARE PREVENTIVE VISIT: ICD-10-CM

## 2025-04-02 DIAGNOSIS — R73.01 IFG (IMPAIRED FASTING GLUCOSE): ICD-10-CM

## 2025-04-07 ASSESSMENT — ENCOUNTER SYMPTOMS
CHILLS: 0
FEVER: 0

## 2025-04-08 LAB
ALBUMIN SERPL-MCNC: 4.7 G/DL (ref 3.6–5.1)
ALP SERPL-CCNC: 89 U/L (ref 37–153)
ALT SERPL-CCNC: 24 U/L (ref 6–29)
ANION GAP SERPL CALCULATED.4IONS-SCNC: 10 MMOL/L (CALC) (ref 7–17)
AST SERPL-CCNC: 19 U/L (ref 10–35)
BILIRUB SERPL-MCNC: 0.4 MG/DL (ref 0.2–1.2)
BUN SERPL-MCNC: 14 MG/DL (ref 7–25)
CALCIUM SERPL-MCNC: 10.2 MG/DL (ref 8.6–10.4)
CHLORIDE SERPL-SCNC: 103 MMOL/L (ref 98–110)
CHOLEST SERPL-MCNC: 228 MG/DL
CHOLEST/HDLC SERPL: 3.3 (CALC)
CO2 SERPL-SCNC: 25 MMOL/L (ref 20–32)
CREAT SERPL-MCNC: 0.56 MG/DL (ref 0.5–1.05)
EGFRCR SERPLBLD CKD-EPI 2021: 101 ML/MIN/1.73M2
EST. AVERAGE GLUCOSE BLD GHB EST-MCNC: 126 MG/DL
EST. AVERAGE GLUCOSE BLD GHB EST-SCNC: 7 MMOL/L
GLUCOSE SERPL-MCNC: 99 MG/DL (ref 65–99)
HBA1C MFR BLD: 6 % OF TOTAL HGB
HDLC SERPL-MCNC: 70 MG/DL
LDLC SERPL CALC-MCNC: 141 MG/DL (CALC)
NONHDLC SERPL-MCNC: 158 MG/DL (CALC)
POTASSIUM SERPL-SCNC: 4.6 MMOL/L (ref 3.5–5.3)
PROT SERPL-MCNC: 7.5 G/DL (ref 6.1–8.1)
SODIUM SERPL-SCNC: 138 MMOL/L (ref 135–146)
TRIGL SERPL-MCNC: 76 MG/DL

## 2025-04-15 ENCOUNTER — APPOINTMENT (OUTPATIENT)
Dept: RADIOLOGY | Facility: CLINIC | Age: 65
End: 2025-04-15
Payer: COMMERCIAL

## 2025-04-22 ENCOUNTER — APPOINTMENT (OUTPATIENT)
Dept: CARDIOLOGY | Facility: HOSPITAL | Age: 65
End: 2025-04-22
Payer: COMMERCIAL

## 2025-04-22 ENCOUNTER — APPOINTMENT (OUTPATIENT)
Dept: RADIOLOGY | Facility: HOSPITAL | Age: 65
End: 2025-04-22
Payer: COMMERCIAL

## 2025-04-22 ENCOUNTER — HOSPITAL ENCOUNTER (OUTPATIENT)
Dept: RADIOLOGY | Facility: HOSPITAL | Age: 65
Discharge: HOME | End: 2025-04-22
Payer: COMMERCIAL

## 2025-04-22 DIAGNOSIS — G89.29 CHRONIC LEFT SHOULDER PAIN: ICD-10-CM

## 2025-04-22 DIAGNOSIS — M25.512 CHRONIC LEFT SHOULDER PAIN: ICD-10-CM

## 2025-04-22 PROCEDURE — 73030 X-RAY EXAM OF SHOULDER: CPT | Mod: LEFT SIDE | Performed by: STUDENT IN AN ORGANIZED HEALTH CARE EDUCATION/TRAINING PROGRAM

## 2025-04-22 PROCEDURE — 73030 X-RAY EXAM OF SHOULDER: CPT | Mod: LT

## 2025-04-24 ENCOUNTER — OFFICE VISIT (OUTPATIENT)
Dept: ORTHOPEDIC SURGERY | Facility: HOSPITAL | Age: 65
End: 2025-04-24
Payer: COMMERCIAL

## 2025-04-24 VITALS — BODY MASS INDEX: 23.86 KG/M2 | WEIGHT: 152 LBS | HEIGHT: 67 IN

## 2025-04-24 DIAGNOSIS — M75.82 ROTATOR CUFF TENDINITIS, LEFT: Primary | ICD-10-CM

## 2025-04-24 DIAGNOSIS — M75.52 BURSITIS OF LEFT SHOULDER: ICD-10-CM

## 2025-04-24 DIAGNOSIS — M75.42 IMPINGEMENT SYNDROME OF LEFT SHOULDER: ICD-10-CM

## 2025-04-24 PROCEDURE — 99204 OFFICE O/P NEW MOD 45 MIN: CPT | Performed by: ORTHOPAEDIC SURGERY

## 2025-04-24 PROCEDURE — 2500000004 HC RX 250 GENERAL PHARMACY W/ HCPCS (ALT 636 FOR OP/ED): Performed by: ORTHOPAEDIC SURGERY

## 2025-04-24 PROCEDURE — 99212 OFFICE O/P EST SF 10 MIN: CPT | Performed by: ORTHOPAEDIC SURGERY

## 2025-04-24 PROCEDURE — 3008F BODY MASS INDEX DOCD: CPT | Performed by: ORTHOPAEDIC SURGERY

## 2025-04-24 PROCEDURE — 1125F AMNT PAIN NOTED PAIN PRSNT: CPT | Performed by: ORTHOPAEDIC SURGERY

## 2025-04-24 PROCEDURE — 1160F RVW MEDS BY RX/DR IN RCRD: CPT | Performed by: ORTHOPAEDIC SURGERY

## 2025-04-24 PROCEDURE — 1159F MED LIST DOCD IN RCRD: CPT | Performed by: ORTHOPAEDIC SURGERY

## 2025-04-24 PROCEDURE — 20610 DRAIN/INJ JOINT/BURSA W/O US: CPT | Mod: LT | Performed by: ORTHOPAEDIC SURGERY

## 2025-04-24 PROCEDURE — 4004F PT TOBACCO SCREEN RCVD TLK: CPT | Performed by: ORTHOPAEDIC SURGERY

## 2025-04-24 RX ORDER — TRIAMCINOLONE ACETONIDE 40 MG/ML
40 INJECTION, SUSPENSION INTRA-ARTICULAR; INTRAMUSCULAR
Status: COMPLETED | OUTPATIENT
Start: 2025-04-24 | End: 2025-04-24

## 2025-04-24 RX ORDER — LIDOCAINE HYDROCHLORIDE 10 MG/ML
4 INJECTION, SOLUTION INFILTRATION; PERINEURAL
Status: COMPLETED | OUTPATIENT
Start: 2025-04-24 | End: 2025-04-24

## 2025-04-24 RX ADMIN — TRIAMCINOLONE ACETONIDE 40 MG: 40 INJECTION, SUSPENSION INTRA-ARTICULAR; INTRAMUSCULAR at 10:56

## 2025-04-24 RX ADMIN — LIDOCAINE HYDROCHLORIDE 4 ML: 10 INJECTION, SOLUTION INFILTRATION; PERINEURAL at 10:56

## 2025-04-24 ASSESSMENT — PAIN DESCRIPTION - DESCRIPTORS: DESCRIPTORS: ACHING;BURNING

## 2025-04-24 ASSESSMENT — PAIN - FUNCTIONAL ASSESSMENT: PAIN_FUNCTIONAL_ASSESSMENT: 0-10

## 2025-04-24 ASSESSMENT — PAIN SCALES - GENERAL: PAINLEVEL_OUTOF10: 4

## 2025-04-24 NOTE — PROGRESS NOTES
64 y.o. female presents today for evaluation of left shoulder pain for months, getting worse recently. The patient reports gradual onset of worsening pain without injury. Pain is controlled. Patient reports no numbness and tingling.  Reports no previous surgeries, injections, or trauma to the area.  Reports pain worse with use, better at rest.   Pain dull ache, sharp at times.  Worse at nighttime.  Cannot take anti-inflammatories due to history of ulcer.  Uses Voltaren gel which helps some.    Review of Systems    Constitutional: see HPI, no fever, no chills, not feeling tired, no significant weight gain or weight loss.   Eyes: No vision changes  ENT: no nosebleeds.   Cardiovascular: no chest pain.   Respiratory: no shortness of breath and no cough.   Gastrointestinal: no abdominal pain, no nausea, no vomiting and no diarrhea.   Musculoskeletal: per HPI  Neurological: no headache, no gait disturbances  Psychiatric: no depression and no sleep disturbances.   Endocrine: no muscle weakness and no muscle cramps.   Hematologic/Lymphatic: no swollen glands and no tendency for easy bruising or excessive swelling.     Patient's past medical history, past surgical history, allergies, and medications have been reviewed unless otherwise noted in the chart.     Shoulder:  Inspection:  no evidence of infection, no erythema, no edema, no ecchymosis, Palpation:  compartments are soft  Skin:  intact, Vascular:  capillary refill <2 seconds distally, Sensation:  sensation intact to light touch distally; AROM- Abduction 90, elevation to 120 degrees, ER 90 degrees, IR limited S1;  NTTP at the biceps tendon,  NTTP at AC joint; TTP on the lateral deltoid Special- painful Chandler test, Neer's Test, cross body test; painful Empty can test/Drop Arm test;  negative Yergason's/Speed's Test;   painful belly pressed and posterior liftoff     Constitutional   General appearance: Alert and in no acute distress. Well developed, well nourished.     Eyes   External Eye, Conjunctiva and lids: Normal external exam - pupils were equal in size, round, reactive to light (PERRL) with normal accommodation and extraocular movements intact (EOMI).   Ears, Nose, Mouth, and Throat   Hearing: Normal.   Neck   Neck: No neck mass was observed. Supple.   Pulmonary   Respiratory effort: No respiratory distress.   Cardiovascular   Examination of extremities: No peripheral edema.   Psychiatric   Judgment and insight: Intact.   Orientation to person, place, and time: Alert and oriented x 3.       Mood and affect: Normal.      XR - no fractures, no dislocations, or no osseous abnormalities left shoulder    X-rays were personally reviewed by me. Radiology reports were reviewed by me as well, if available at the time.      Left shoulder rotator cuff tendinitis, bursitis, impingement syndrome  Based on the history, physical exam and imaging studies above, the patient's presentation is consistent with the above diagnosis.  I had a long discussion with the patient regarding their presentation and the treatment options.  We discussed initial nonoperative versus operative management options as well as potential further diagnostic imaging.  We again discussed her treatment options going forward along with their associated risks and benefits. After thorough discussion, the patient has elected to proceed with conservative management. All questions were answered to the patients satisfaction who seems satisfied with the plan.  They will call the office with any questions/concerns.    Discussion I discussed the diagnosis and treatment options with the patient today along with their associated risks and benefits. After thorough discussion, the patient has elected to proceed with a corticosteroid injection with Kenalog and Xylocaine, which was performed in the office today under aseptic technique and the patient tolerated the procedure well.          Ortho Injections:           Injection site  left shoulder. Medication 4 cc 1% Xylocaine, 1 cc 40 mg Kenalog, Injection given under sterile conditions. No immediate complications noted. Post injection instructions given.  Physical therapy evaluation and treatment  Voltaren gel as needed  Follow-up 2 months as needed no x-ray    Patient ID: Lei Day is a 64 y.o. female.    L Inj/Asp: L glenohumeral on 4/24/2025 10:56 AM  Indications: pain  Details: 22 G needle, posterior approach  Medications: 40 mg triamcinolone acetonide 40 mg/mL; 4 mL lidocaine 10 mg/mL (1 %)  Outcome: tolerated well, no immediate complications  Procedure, treatment alternatives, risks and benefits explained, specific risks discussed. Consent was given by the patient. Immediately prior to procedure a time out was called to verify the correct patient, procedure, equipment, support staff and site/side marked as required. Patient was prepped and draped in the usual sterile fashion.

## 2026-04-02 ENCOUNTER — APPOINTMENT (OUTPATIENT)
Dept: PRIMARY CARE | Facility: CLINIC | Age: 66
End: 2026-04-02
Payer: COMMERCIAL